# Patient Record
Sex: FEMALE | Race: WHITE
[De-identification: names, ages, dates, MRNs, and addresses within clinical notes are randomized per-mention and may not be internally consistent; named-entity substitution may affect disease eponyms.]

---

## 2017-08-03 ENCOUNTER — HOSPITAL ENCOUNTER (OUTPATIENT)
Dept: HOSPITAL 61 - MAMMO | Age: 72
Discharge: HOME | End: 2017-08-03
Attending: OBSTETRICS & GYNECOLOGY
Payer: MEDICARE

## 2017-08-03 DIAGNOSIS — Z12.31: Primary | ICD-10-CM

## 2017-08-03 NOTE — RAD
DATE: 8/3/2017



EXAM: DIGITAL SCREEN BILAT W/CAD



HISTORY: Routine screening



COMPARISON: 8/2/2016



This study was interpreted with the benefit of Computerized Aided Detection

(CAD).





The breast parenchyma is primarily fatty replaced. Breast parenchyma level

density A.





FINDINGS: No new or enlarging breast densities are seen. Benign type

calcifications are present. No suspicious microcalcifications have developed. 







IMPRESSION: Stable mammograms without evidence of malignancy.





BI-RADS CATEGORY: 2 BENIGN FINDING(S)



RECOMMENDED FOLLOW-UP: 12M 12 MONTH FOLLOW-UP



PQRS compliance statement: Patient information was entered into a reminder

system with a target due date     for the next mammogram.



Mammography is a sensitive method for finding small breast cancers, but it

does not detect them all and is not a substitute for careful clinical

examination.  A negative mammogram does not negate a clinically suspicious

finding and should not result in delay in biopsying a clinically suspicious

abnormality.



"Our facility is accredited by the American [employer] Mammography

Program."

## 2017-08-08 ENCOUNTER — HOSPITAL ENCOUNTER (OUTPATIENT)
Dept: HOSPITAL 61 - PNCL | Age: 72
End: 2017-08-08
Attending: ANESTHESIOLOGY
Payer: MEDICARE

## 2017-08-08 DIAGNOSIS — M51.16: Primary | ICD-10-CM

## 2017-08-08 DIAGNOSIS — Z83.3: ICD-10-CM

## 2017-08-08 DIAGNOSIS — Z79.82: ICD-10-CM

## 2017-08-08 DIAGNOSIS — M48.06: ICD-10-CM

## 2017-08-08 DIAGNOSIS — E11.9: ICD-10-CM

## 2017-08-08 DIAGNOSIS — I10: ICD-10-CM

## 2017-08-08 PROCEDURE — 62323 NJX INTERLAMINAR LMBR/SAC: CPT

## 2017-08-08 NOTE — PAIN
DATE OF SERVICE:  08/08/2017



PROGRESS NOTE FOR PAIN CLINIC



DIAGNOSES:  Lumbar radiculopathy with lumbar degenerative disk disease and

lumbar spinal stenosis.



HISTORY OF PRESENT ILLNESS:  The patient is a 72-year-old female who returns for

followup status post lumbar epidural steroid injection, last seen on 10/05/2016.

 The patient did very well with about 100% improvement in her low back and right

lower extremity pain until the last 2-3 months, the patient reports there has

been increasing pain into the low back and right lower extremity, mostly in the

posterior gluteus, posterior thigh region, and posterior calf, aching, shooting

off and on, worse with standing and walking, rates it at 8 on a scale of 10 at

its worst, is currently a 3 on a scale of 10, is on average a 4 on a scale of

10.  The patient reports no new motor or sensory deficits, but she has been

falling and has fallen about 3 times over the past several months because the

leg feels more fatigued and easily gives out.  The patient reports it does not

awaken her from sleep at night, feels much better with sitting or lying down,

but again most noticeable with standing and walking.  It has been bothering her

at work as well.  She is on her feet most of her working day.  The patient

reports no new motor or sensory deficits, no new bowel or bladder incontinence

or any other complaints.



PAST MEDICAL HISTORY:  Significant for diabetes and hypertension.



PAST SURGICAL HISTORY:  Tonsillectomy, breast biopsy, cervical fusion and right

L4-L5 diskectomy in 2007.



CURRENT MEDICATIONS:  Include oxycodone, simvastatin, fish oil, Prilosec,

metformin, daily baby aspirin, vitamins, biotin, glimepiride, and losartan.



ALLERGIES:  THE PATIENT HAS NO KNOWN DRUG ALLERGIES, HAS LATEX SENSITIVITY.



FAMILY HISTORY:  Significant for diabetes, cancer, heart disease, kidney

disease, and tuberculosis.



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

pain, but has to stop frequently when she is working and standing and even

walking she has to sit down, sometimes on the floor if there is no chair

available, does not use any tobacco products, does not use any alcohol.



PHYSICAL EXAMINATION:

VITAL SIGNS:  Today, the patient's blood pressure is 143/75, pulse 75,

respirations are 18, temperature is 98.6 degrees Fahrenheit, weight is 146

pounds.

GENERAL:  The patient is awake, alert, oriented, appropriate, very pleasant

demeanor.

HEENT:  Head shows normocephalic, atraumatic.  Extraocular movements are intact

and symmetrical.  Oral cavity, mucous membranes are moist and pink.  Dentition

is intact.

NECK:  Shows anterior throat supple.  Swallow reflex is symmetrical.  Neck shows

full rotational motion of the cervical spine without difficulty.

CHEST:  Shows normal on inspection.  Breath sounds are clear to auscultation

bilaterally.

HEART:  Shows S1 and S2 clear.

ABDOMEN:  Soft, nontender, nondistended.  No palpable organomegaly.  There is no

rebound or guarding demonstrated.

BACK:  Shows spine grossly in the midline.  Well-healed surgical scars noted in

the lumbar distribution.  Lumbar paraspinous muscle shows symmetrical on

inspection with palpation shows some moderate tenderness in the low lumbar

distribution diffusely bilaterally, but without radiation.  No tenderness over

the sacrum or sacroiliac regions.  The patient has good rotation and motion of

the lumbar spine, both laterally as well as extension and flexion without

difficulty.

EXTREMITIES:  Lower extremities showed deep tendon reflexes 2+ in the patellar,

1+ tendo-calcaneus tendons are equal.  Motor exam is strong with 5/5

dorsiflexion, extension, quadriceps and hamstring flexion and symmetrical

bilaterally.  Peripheral pulses are 1+ bilaterally.  No peripheral edema is

noted.



Options were discussed with the patient and the patient's old chart was reviewed

as her current medication regimen updated.  Current review of systems updated

today as well.  We will proceed with lumbar epidural steroid injections.  She

has done very well with these in the past.  Risks were again discussed

including, but not limited to bleeding, infection, possibility of epidural

hematoma, subsequent neurologic compromise, dural punctures, headaches, spinal

cord and/or nerve damage, side effects of steroid medication and poor results

regarding pain control.  The patient understands and wishes to proceed.  The

patient will return to clinic in approximately 2 weeks for followup.  She was

counseled on return appointment, activity level and side effects to be aware of.



DIAGNOSES:  Lumbar radiculopathy with lumbar degenerative disk disease and

spinal stenosis.



PROCEDURE:  Lumbar epidural steroid injection in translaminar approach at the

L5-S1 level using C-arm fluoroscopic guidance under sterile prep and drape using

local anesthetic.



MEDICATIONS INJECTED:  A total of 120 mg Depo-Medrol plus 10 mL of

preservative-free normal saline and 2 mL of Isovue for contrast.



CONDITION AT DISCHARGE:  Stable.  The patient tolerated the procedure well, had

no complications.

 



______________________________

ALEK VERA MD



DR:  GARTH/yary  JOB#:  [id number] / [id number]

DD:  08/08/2017 08:28  DT:  08/08/2017 09:26

## 2017-10-18 ENCOUNTER — HOSPITAL ENCOUNTER (OUTPATIENT)
Dept: HOSPITAL 61 - LAB | Age: 72
Discharge: HOME | End: 2017-10-18
Attending: FAMILY MEDICINE
Payer: MEDICARE

## 2017-10-18 DIAGNOSIS — E11.9: Primary | ICD-10-CM

## 2017-10-18 DIAGNOSIS — E78.5: ICD-10-CM

## 2017-10-18 DIAGNOSIS — E83.52: ICD-10-CM

## 2017-10-18 LAB
ALBUMIN SERPL-MCNC: 4.3 G/DL (ref 3.4–5)
ALBUMIN/GLOB SERPL: 1 {RATIO} (ref 1–1.7)
ALP SERPL-CCNC: 153 U/L (ref 46–116)
ALT SERPL-CCNC: 36 U/L (ref 14–59)
ANION GAP SERPL CALC-SCNC: 10 MMOL/L (ref 6–14)
AST SERPL-CCNC: 24 U/L (ref 15–37)
BILIRUB SERPL-MCNC: 0.5 MG/DL (ref 0.2–1)
BUN SERPL-MCNC: 16 MG/DL (ref 7–20)
BUN/CREAT SERPL: 16 (ref 6–20)
CALCIUM SERPL-MCNC: 9.8 MG/DL (ref 8.5–10.1)
CHLORIDE SERPL-SCNC: 103 MMOL/L (ref 98–107)
CO2 SERPL-SCNC: 31 MMOL/L (ref 21–32)
CREAT SERPL-MCNC: 1 MG/DL (ref 0.6–1)
GFR SERPLBLD BASED ON 1.73 SQ M-ARVRAT: 54.5 ML/MIN
GLOBULIN SER-MCNC: 4.3 G/DL (ref 2.2–3.8)
GLUCOSE SERPL-MCNC: 156 MG/DL (ref 70–99)
POTASSIUM SERPL-SCNC: 4.4 MMOL/L (ref 3.5–5.1)
PROT SERPL-MCNC: 8.6 G/DL (ref 6.4–8.2)
PTH-INTACT SERPL-MCNC: 25 PG/ML (ref 15–65)
SODIUM SERPL-SCNC: 144 MMOL/L (ref 136–145)

## 2017-10-18 PROCEDURE — 80053 COMPREHEN METABOLIC PANEL: CPT

## 2017-10-18 PROCEDURE — 36415 COLL VENOUS BLD VENIPUNCTURE: CPT

## 2017-10-18 PROCEDURE — 83970 ASSAY OF PARATHORMONE: CPT

## 2017-10-20 ENCOUNTER — HOSPITAL ENCOUNTER (OUTPATIENT)
Dept: HOSPITAL 61 - KCIC DEXA | Age: 72
Discharge: HOME | End: 2017-10-20
Attending: FAMILY MEDICINE
Payer: MEDICARE

## 2017-10-20 DIAGNOSIS — Z13.820: Primary | ICD-10-CM

## 2017-10-20 DIAGNOSIS — N95.9: ICD-10-CM

## 2017-10-20 DIAGNOSIS — M85.80: ICD-10-CM

## 2017-10-20 PROCEDURE — 77080 DXA BONE DENSITY AXIAL: CPT

## 2017-10-20 NOTE — KCIC
EXAM: Dual energy x-ray absorptiometry (DEXA).

 

HISTORY: Postmenopausal female presents for osteoporosis screening.

 

COMPARISON: None.

 

TECHNIQUE: Dual energy x-ray absorptiometry of the lumbar spine and left 

hip was performed.  Calculation of bone mineral density based on standard 

deviations above or below the expected young adult normal value (T-score) 

was completed. 

 

FINDINGS: The average bone mineral density in the 1st through 4th lumbar 

vertebrae is 0.992 g/cmxcm, corresponding with a T-score of -0.5.

 

The average total bone mineral density in the left hip is 0.739 g/cmxcm, 

corresponding with a  T-score of -1.7.

 

IMPRESSION:

1. Osteopenia measured at the left hip.

2. Normal bone mineral density measured the lumbar spine.

 

Note: Definitions established by the World Health Organization:

 

1. Normal: T-score is -1.0 or above.

2. Osteopenia: T-score is between -1.0 and -2.5 .

3. Osteoporosis: T-score is -2.5 or below. 

 

Electronically signed by: Aggie Patel MD (10/20/2017 1:20 PM) [place] [id number]

## 2017-11-21 ENCOUNTER — HOSPITAL ENCOUNTER (OUTPATIENT)
Dept: HOSPITAL 61 - LAB | Age: 72
Discharge: HOME | End: 2017-11-21
Attending: INTERNAL MEDICINE
Payer: MEDICARE

## 2017-11-21 DIAGNOSIS — M19.011: ICD-10-CM

## 2017-11-21 DIAGNOSIS — M19.012: Primary | ICD-10-CM

## 2017-11-21 LAB
CHOLEST SERPL-MCNC: 194 MG/DL (ref 0–200)
CHOLEST/HDLC SERPL: 3.9 {RATIO}
HDLC SERPL-MCNC: 50 MG/DL (ref 40–60)
NONHDLC SERPL-MCNC: 144 MG/DL (ref 0–129)
TRIGL SERPL-MCNC: 146 MG/DL (ref 0–150)

## 2017-11-21 PROCEDURE — 86141 C-REACTIVE PROTEIN HS: CPT

## 2017-11-21 PROCEDURE — 80061 LIPID PANEL: CPT

## 2017-11-21 PROCEDURE — 73000 X-RAY EXAM OF COLLAR BONE: CPT

## 2017-11-21 NOTE — RAD
EXAM: Bilateral clavicles 2 views.



HISTORY: Increasing visible prominence of left sternoclavicular joint.



COMPARISON: 1/11/2016.



FINDINGS: The left medial clavicle appears more hypertrophic than on the prior

study. There is no clear associated mass in this is likely from moderate

osteoarthritis. Sternoclavicular osteoarthritis appears mild on the right.



Acromioclavicular osteoarthritis is minimal on the right and mild on the left

for patient age. An inferiorly directed clavicular spur on the left measures 4

mm.



Bilateral glenohumeral osteoarthritis is mild. No fractures are identified.



IMPRESSION:

1. Hypertrophy of the left medial clavicle appears increased since the prior

study. No associated osseous lesion is seen and this is likely secondary to

moderate left sternoclavicular osteoarthritis.

2. Correlate for impingement at the left shoulder.

3. Mild bilateral glenohumeral osteoarthritis.

## 2018-01-09 ENCOUNTER — HOSPITAL ENCOUNTER (OUTPATIENT)
Dept: HOSPITAL 61 - MRI | Age: 73
Discharge: HOME | End: 2018-01-09
Attending: INTERNAL MEDICINE
Payer: MEDICARE

## 2018-01-09 DIAGNOSIS — M48.02: ICD-10-CM

## 2018-01-09 DIAGNOSIS — Z98.890: ICD-10-CM

## 2018-01-09 DIAGNOSIS — R29.6: ICD-10-CM

## 2018-01-09 DIAGNOSIS — M51.26: ICD-10-CM

## 2018-01-09 DIAGNOSIS — M25.78: Primary | ICD-10-CM

## 2018-01-09 PROCEDURE — 72148 MRI LUMBAR SPINE W/O DYE: CPT

## 2018-01-09 PROCEDURE — 72146 MRI CHEST SPINE W/O DYE: CPT

## 2018-01-09 PROCEDURE — 72141 MRI NECK SPINE W/O DYE: CPT

## 2018-03-12 ENCOUNTER — HOSPITAL ENCOUNTER (OUTPATIENT)
Dept: HOSPITAL 61 - PNCL | Age: 73
End: 2018-03-12
Attending: ANESTHESIOLOGY
Payer: MEDICARE

## 2018-03-12 DIAGNOSIS — M51.16: Primary | ICD-10-CM

## 2018-03-12 DIAGNOSIS — M48.062: ICD-10-CM

## 2018-03-12 PROCEDURE — 62323 NJX INTERLAMINAR LMBR/SAC: CPT

## 2018-04-02 ENCOUNTER — HOSPITAL ENCOUNTER (OUTPATIENT)
Dept: HOSPITAL 61 - LAB | Age: 73
Discharge: HOME | End: 2018-04-02
Payer: MEDICARE

## 2018-04-02 DIAGNOSIS — R26.89: ICD-10-CM

## 2018-04-02 DIAGNOSIS — R74.8: Primary | ICD-10-CM

## 2018-04-02 DIAGNOSIS — R41.3: ICD-10-CM

## 2018-04-02 LAB
CK SERPL-CCNC: 122 U/L (ref 26–192)
CRP SERPL-MCNC: 1.6 MG/L (ref 0–3.3)
FOLATE: 10.27 NG/ML (ref 3.2–20)
MAGNESIUM: 1.9 MG/DL (ref 1.8–2.4)
SEDIMENTATION RATE: 8 (ref 0–25)
THYROID STIM HORMONE (TSH): 1.79 UIU/ML (ref 0.36–3.74)
VITAMIN-B12: 260 PG/ML (ref 247–911)

## 2018-04-02 PROCEDURE — 85651 RBC SED RATE NONAUTOMATED: CPT

## 2018-04-02 PROCEDURE — 82607 VITAMIN B-12: CPT

## 2018-04-02 PROCEDURE — 83735 ASSAY OF MAGNESIUM: CPT

## 2018-04-02 PROCEDURE — 82746 ASSAY OF FOLIC ACID SERUM: CPT

## 2018-04-02 PROCEDURE — 84443 ASSAY THYROID STIM HORMONE: CPT

## 2018-04-02 PROCEDURE — 86140 C-REACTIVE PROTEIN: CPT

## 2018-04-02 PROCEDURE — 84165 PROTEIN E-PHORESIS SERUM: CPT

## 2018-04-02 PROCEDURE — 82330 ASSAY OF CALCIUM: CPT

## 2018-04-02 PROCEDURE — 82550 ASSAY OF CK (CPK): CPT

## 2018-04-02 PROCEDURE — 36415 COLL VENOUS BLD VENIPUNCTURE: CPT

## 2018-04-02 PROCEDURE — 84075 ASSAY ALKALINE PHOSPHATASE: CPT

## 2018-04-02 PROCEDURE — 86334 IMMUNOFIX E-PHORESIS SERUM: CPT

## 2018-04-05 LAB
ALBUM: 4.3 G/DL (ref 2.9–4.4)
ALPHA 1: 0.3 G/DL (ref 0–0.4)
ALPHA 2: 0.9 G/DL (ref 0.4–1)
BETA: 1.3 G/DL (ref 0.7–1.3)
GAMMA: 0.8 G/DL (ref 0.4–1.8)
IMMUNOGLOBULIN A: 278 MG/DL (ref 64–422)
IMMUNOGLOBULIN G: 727 MG/DL (ref 700–1600)
IMMUNOGLOBULIN M: 86 MG/DL (ref 26–217)
PROTEIN TOTAL: 7.5 G/DL (ref 6–8.5)
SPEP AG RATIO: 1.3 (ref 0.7–1.7)

## 2018-04-16 ENCOUNTER — HOSPITAL ENCOUNTER (OUTPATIENT)
Dept: HOSPITAL 61 - MRI | Age: 73
Discharge: HOME | End: 2018-04-16
Payer: MEDICARE

## 2018-04-16 DIAGNOSIS — E11.9: ICD-10-CM

## 2018-04-16 DIAGNOSIS — M48.02: Primary | ICD-10-CM

## 2018-04-16 DIAGNOSIS — E78.5: ICD-10-CM

## 2018-04-16 LAB
CREAT SERPL-MCNC: 0.9 MG/DL (ref 0.6–1)
GFR SERPLBLD BASED ON 1.73 SQ M-ARVRAT: 61.5 ML/MIN

## 2018-04-16 PROCEDURE — 82565 ASSAY OF CREATININE: CPT

## 2018-04-16 PROCEDURE — 36415 COLL VENOUS BLD VENIPUNCTURE: CPT

## 2018-04-16 PROCEDURE — 70553 MRI BRAIN STEM W/O & W/DYE: CPT

## 2018-04-16 RX ADMIN — GADOBUTROL 1 MMOL: 604.72 INJECTION INTRAVENOUS at 15:33

## 2018-08-07 ENCOUNTER — HOSPITAL ENCOUNTER (OUTPATIENT)
Dept: HOSPITAL 61 - LAB | Age: 73
Discharge: HOME | End: 2018-08-07
Attending: INTERNAL MEDICINE
Payer: MEDICARE

## 2018-08-07 ENCOUNTER — HOSPITAL ENCOUNTER (OUTPATIENT)
Dept: HOSPITAL 61 - MAMMO | Age: 73
Discharge: HOME | End: 2018-08-07
Attending: FAMILY MEDICINE
Payer: MEDICARE

## 2018-08-07 DIAGNOSIS — E78.5: Primary | ICD-10-CM

## 2018-08-07 DIAGNOSIS — E11.9: ICD-10-CM

## 2018-08-07 DIAGNOSIS — E78.5: ICD-10-CM

## 2018-08-07 DIAGNOSIS — I10: ICD-10-CM

## 2018-08-07 DIAGNOSIS — Z12.31: Primary | ICD-10-CM

## 2018-08-07 LAB
CHOLESTEROL/HDL RATIO: 1.9
CHOLESTEROL: 88 MG/DL (ref 0–200)
HDLC: 47 MG/DL (ref 40–60)
LDLC: 22 MG/DL (ref 0–100)
NON-HDL CHOLESTEROL: 41 MG/DL (ref 0–129)
TRIGLYCERIDES: 93 MG/DL (ref 0–150)
VLDLC: 19 MG/DL (ref 0–40)

## 2018-08-07 PROCEDURE — 77063 BREAST TOMOSYNTHESIS BI: CPT

## 2018-08-07 PROCEDURE — 83695 ASSAY OF LIPOPROTEIN(A): CPT

## 2018-08-07 PROCEDURE — 36415 COLL VENOUS BLD VENIPUNCTURE: CPT

## 2018-08-07 PROCEDURE — 77067 SCR MAMMO BI INCL CAD: CPT

## 2018-08-07 PROCEDURE — 80061 LIPID PANEL: CPT

## 2018-08-09 LAB — LIPOPROTEIN A: 4 NMOL/L (ref ?–75)

## 2018-10-17 ENCOUNTER — HOSPITAL ENCOUNTER (OUTPATIENT)
Dept: HOSPITAL 61 - PNCL | Age: 73
Discharge: HOME | End: 2018-10-17
Attending: ANESTHESIOLOGY
Payer: MEDICARE

## 2018-10-17 DIAGNOSIS — M48.062: ICD-10-CM

## 2018-10-17 DIAGNOSIS — M51.16: Primary | ICD-10-CM

## 2018-10-17 PROCEDURE — 62323 NJX INTERLAMINAR LMBR/SAC: CPT

## 2018-10-17 NOTE — PAIN
DATE OF SERVICE:  10/17/2018



DIAGNOSES:  Lumbar radiculopathy with lumbar degenerative disk disease, lumbar

spinal stenosis with neurogenic claudication.



HISTORY OF PRESENT ILLNESS:  The patient is a 73-year-old female who returns for

followup status post lumbar epidural steroid injection, most recently

03/12/2018.  The patient did very well with this with near 90% improvement until

the last few weeks, pain has been returning in the low back and bilaterally in

the lower extremity slightly worse on the left than the right, as it was worse

on the right last time, now is worse on the left in the lateral anterior thigh,

posterior thigh, posterior calf, posterior ankle on the left side with walking,

standing, changing positions, worse with standing for prolonged periods or

riding in a car.  The patient had a long trip, which she took a few months ago,

which caused some significant pain across the low back and into the left leg. 

The patient reports last 3 days as some constant pain.  The patient reports it

is radiating, becoming more severe, more constant.  It is described as a 9 on a

scale of 10 at its worst, 5 on average, 2 at its least and is a 5 today.  The

patient reports no new motor or sensory deficits, no new bowel or bladder

incontinence.  Initially, she was able to walk with greater ease and comfort,

was working, doing more functions much better and with more ease, sleeping well

at night, still does not awaken her from sleep, better with lying down.



PHYSICAL EXAMINATION:

VITAL SIGNS:  The patient's blood pressure 107/60, pulse 79, respirations 16,

temperature 98.4 degrees Fahrenheit, height is 5 feet 6 inches, weight is 140

pounds.

GENERAL:  The patient is awake, alert, oriented, appropriate, very pleasant

demeanor.

HEENT:  Head shows normocephalic, atraumatic.  Extraocular muscles are intact

and symmetrical.  Oral cavity:  Mucous membranes moist and pink.  Dentition is

intact.

NECK:  Shows anterior throat supple without palpable lymphadenopathy noted. 

Swallow reflex is symmetrical.

CHEST:  Shows normal on inspection.  Breath sounds clear to auscultation

bilaterally.

HEART:  Shows S1, S2 clear.  No murmurs auscultated.

ABDOMEN:  Soft, nontender, nondistended.  No palpable organomegaly is noted.  No

rebound or guarding demonstrated.

BACK:  Shows spine grossly in the midline.  Normal appearing thoracic kyphosis

and lumbar lordotic curvature.  Lumbar paraspinous muscle shows symmetrical on

inspection.  On palpation shows some mild tenderness throughout the upper,

middle, lower distribution of paraspinous muscles, but only diffusely without

radiation.  The patient has good rotational motion of lumbar spine, both

laterally as well as extension and flexion without difficulty.

EXTREMITIES:  Lower extremities show deep tendon reflexes at 2+ in the patellar,

1+ tendo-calcaneus tendons.  Motor exam is strong with 5/5 dorsiflexion,

extension, quadriceps and hamstring flexion.  Peripheral pulses are 1+ posterior

tibia.  No peripheral edema is noted bilaterally.



Options were discussed with the patient.  The patient's old chart was reviewed

as her current medication regimen updated.  Current review of systems updated

today as well.  We will proceed with a lumbar epidural steroid injection first

in this series.  Risks were again discussed including, but not limited to

bleeding, infection, possibility of epidural hematoma, subsequent neurological

compromise, dural puncture, headaches, spinal cord and/or nerve damage, side

effects of steroid medication and poor results regarding pain control.  The

patient understands and wished to proceed.  The patient to return to clinic in

approximately 2 weeks for followup, was counseled as to return appointment,

activity level and side effects to be aware of.



DIAGNOSES:  Lumbar radiculopathy with lumbar degenerative disk disease, lumbar

spinal stenosis with neurogenic claudication.



PROCEDURE:  Lumbar epidural steroid injection, translaminar approach at the

L5-S1 level using C-arm fluoroscopic guidance under sterile prep and drape using

local anesthetic.



MEDICATION INJECTED:  A total of 120 mg Depo-Medrol plus 10 mL of

preservative-free normal saline and 2 mL of Isovue for contrast.



CONDITION AT DISCHARGE:  Stable.  The patient tolerated the procedure well, had

no complications.

 



______________________________

ALEK VERA MD



DR:  GARTH/yary  JOB#:  [id number] / [id number]

DD:  10/17/2018 08:13  DT:  10/17/2018 19:04

## 2018-10-23 ENCOUNTER — HOSPITAL ENCOUNTER (OUTPATIENT)
Dept: HOSPITAL 61 - LAB | Age: 73
Discharge: HOME | End: 2018-10-23
Attending: INTERNAL MEDICINE
Payer: MEDICARE

## 2018-10-23 DIAGNOSIS — I10: Primary | ICD-10-CM

## 2018-10-23 DIAGNOSIS — E78.2: ICD-10-CM

## 2018-10-23 LAB
T3RU NFR SERPL: 31 % (ref 24–39)
T4 SERPL-MCNC: 11.2 UG/DL (ref 4.5–12)

## 2018-10-23 PROCEDURE — 84436 ASSAY OF TOTAL THYROXINE: CPT

## 2018-10-23 PROCEDURE — 83695 ASSAY OF LIPOPROTEIN(A): CPT

## 2018-10-23 PROCEDURE — 84479 ASSAY OF THYROID (T3 OR T4): CPT

## 2018-10-23 PROCEDURE — 82465 ASSAY BLD/SERUM CHOLESTEROL: CPT

## 2018-10-23 PROCEDURE — 84443 ASSAY THYROID STIM HORMONE: CPT

## 2018-10-23 PROCEDURE — 36415 COLL VENOUS BLD VENIPUNCTURE: CPT

## 2018-12-17 ENCOUNTER — HOSPITAL ENCOUNTER (OUTPATIENT)
Dept: HOSPITAL 61 - LAB | Age: 73
Discharge: HOME | End: 2018-12-17
Attending: INTERNAL MEDICINE
Payer: MEDICARE

## 2018-12-17 DIAGNOSIS — E78.2: Primary | ICD-10-CM

## 2018-12-17 LAB
CHOLEST SERPL-MCNC: 96 MG/DL (ref 0–200)
CHOLEST/HDLC SERPL: 1.8 {RATIO}
HDLC SERPL-MCNC: 54 MG/DL (ref 40–60)
LDLC: 20 MG/DL (ref 0–100)
TRIGL SERPL-MCNC: 111 MG/DL (ref 0–150)
VLDLC: 22 MG/DL (ref 0–40)

## 2018-12-17 PROCEDURE — 80061 LIPID PANEL: CPT

## 2018-12-17 PROCEDURE — 36415 COLL VENOUS BLD VENIPUNCTURE: CPT

## 2019-03-08 ENCOUNTER — HOSPITAL ENCOUNTER (OUTPATIENT)
Dept: HOSPITAL 61 - PNCL | Age: 74
Discharge: HOME | End: 2019-03-08
Attending: ANESTHESIOLOGY
Payer: MEDICARE

## 2019-03-08 DIAGNOSIS — M48.062: ICD-10-CM

## 2019-03-08 DIAGNOSIS — M51.16: Primary | ICD-10-CM

## 2019-03-08 PROCEDURE — 62323 NJX INTERLAMINAR LMBR/SAC: CPT

## 2019-03-08 NOTE — PAIN
DATE OF SERVICE:  03/08/2019



DIAGNOSES:  Lumbar radiculopathy with lumbar degenerative disk disease, lumbar

spinal stenosis with neurogenic claudication.



HISTORY OF PRESENT ILLNESS:  The patient is a 73-year-old female who returns for

followup status post lumbar epidural steroid injection x 1, last seen

10/17/2018.  The patient did very well with this with 95% improvement until

about 2 weeks ago, the patient reports the pain began to return to the low back,

right lower extremity, posterior gluteus, posterior thigh, posterior calf,

radiating into the lateral calf as well on the right side.  The patient reports

it is worse with walking, standing, change in positions especially she was

increasing her distance walking, increasing work activities, home activities,

sleeping better at night, still sleeps well, does not awaken her from sleep. 

The pain is becoming more noticeable with walking and standing and with

prolonged sitting.  The patient reports it is a 9 on a scale of 10 at its worst,

4 on average, 1 at its least and is a 4 today.  The patient reports it is aching

in the low back, right hip, down the right leg, radiating with some cramping and

shooting pain as well, on and off in intensity, worse with standing and walking

for more than 10-15 minutes.  The patient reports no new motor or sensory

deficits, no new bowel or bladder incontinence or other complaints.



PHYSICAL EXAMINATION:

VITAL SIGNS:  The patient's blood pressure is 132/80, pulse 72, respirations are

18, temperature is 98.8 degrees Fahrenheit, height is 5 feet 6 inches, weight

134 pounds.

GENERAL:  The patient is awake, alert, oriented, appropriate, very pleasant

demeanor.

HEENT:  Head shows normocephalic, atraumatic.  Extraocular movements are intact

and symmetrical.  Oral cavity:  Mucous membranes moist and pink.  Dentition is

intact.

NECK:  Shows anterior throat supple without palpable lymphadenopathy noted. 

Swallow reflex is symmetrical.

CHEST:  Shows normal on inspection.  Breath sounds clear to auscultation

bilaterally.

HEART:  Shows S1, S2 clear.  No murmurs auscultated.

ABDOMEN:  Soft, nontender, nondistended.  No palpable organomegaly is noted.  No

rebound or guarding demonstrated.

BACK:  Shows spine grossly in the midline.  Normal appearing thoracic kyphosis

and some minor flattening of lumbar lordotic curvature.  Lumbar paraspinous

musculature symmetrical on inspection with palpation shows some moderate

tenderness diffusely in the low paraspinous musculature in the distribution of

the lumbar musculature, but without radiation, without trigger points.  The

patient has good rotational motion of lumbar spine, both laterally as well as

extension and flexion without difficulty.  No tenderness over the sacrum or

sacroiliac regions.

EXTREMITIES:  The patient's lower extremities show deep tendon reflexes at 2+ in

the patellar, 1+ tendo calcaneus tendons.  Motor exam is strong with 5/5

dorsiflexion, extension, quadriceps and hamstring flexion equal.  Peripheral

pulses are 1+ posterior tibial.  No peripheral edema is noted bilaterally.



Options were discussed with the patient.  The patient's old chart was reviewed

as her current medication regimen and updated.  Current review of systems is

updated today as well and we will proceed with a second in the series of lumbar

epidural steroid injection today with fluoroscopic guidance.  Risks were again

discussed including, but not limited to bleeding, infection, possibility of

epidural hematoma, subsequent neurological compromise, dural puncture headaches,

spinal cord and/or nerve damage, side effects of steroid medication and poor

results regarding pain control.  The patient understands and wished to proceed. 

The patient will return to clinic in approximately 2 weeks for followup, was

counseled as to return appointment, activity level and side effects to be aware

of.



DIAGNOSIS:  Lumbar radiculopathy with lumbar degenerative disk disease, lumbar

spinal stenosis with neurogenic claudication.



PROCEDURE:  Lumbar epidural steroid injection, translaminar approach L5-S1 level

using C-arm fluoroscopic guidance under sterile prep and drape using local

anesthetic.



MEDICATION INJECTED:  A total of 120 mg Depo-Medrol plus 10 mL of

preservative-free normal saline and 2 mL of Isovue for contrast.



CONDITION AT DISCHARGE:  Stable.  The patient tolerated procedure well, had no

complications.

 



______________________________

ALEK VERA MD



DR:  GARTH/yary  JOB#:  [id number] / [id number]

DD:  03/08/2019 08:48  DT:  03/08/2019 17:43

## 2019-08-26 ENCOUNTER — HOSPITAL ENCOUNTER (OUTPATIENT)
Dept: HOSPITAL 61 - LAB | Age: 74
Discharge: HOME | End: 2019-08-26
Attending: INTERNAL MEDICINE
Payer: MEDICARE

## 2019-08-26 DIAGNOSIS — E11.9: Primary | ICD-10-CM

## 2019-08-26 LAB
ALBUMIN SERPL-MCNC: 4.1 G/DL (ref 3.4–5)
ALBUMIN/GLOB SERPL: 1.2 {RATIO} (ref 1–1.7)
ALP SERPL-CCNC: 124 U/L (ref 46–116)
ALT SERPL-CCNC: 31 U/L (ref 14–59)
ANION GAP SERPL CALC-SCNC: 10 MMOL/L (ref 6–14)
AST SERPL-CCNC: 21 U/L (ref 15–37)
BILIRUB SERPL-MCNC: 0.6 MG/DL (ref 0.2–1)
BUN SERPL-MCNC: 12 MG/DL (ref 7–20)
BUN/CREAT SERPL: 15 (ref 6–20)
CALCIUM SERPL-MCNC: 9.7 MG/DL (ref 8.5–10.1)
CHLORIDE SERPL-SCNC: 104 MMOL/L (ref 98–107)
CHOLEST SERPL-MCNC: 89 MG/DL (ref 0–200)
CHOLEST/HDLC SERPL: 1.9 {RATIO}
CO2 SERPL-SCNC: 29 MMOL/L (ref 21–32)
CREAT SERPL-MCNC: 0.8 MG/DL (ref 0.6–1)
GFR SERPLBLD BASED ON 1.73 SQ M-ARVRAT: 70.1 ML/MIN
GLOBULIN SER-MCNC: 3.3 G/DL (ref 2.2–3.8)
GLUCOSE SERPL-MCNC: 127 MG/DL (ref 70–99)
HBV SURFACE AB SER RIA-ACNC: 59.4 MG/DL
HBV SURFACE AG SERPL QL IA: 9.3 UG/ML
HDLC SERPL-MCNC: 47 MG/DL (ref 40–60)
LDLC: 18 MG/DL (ref 0–100)
MICRO CREAT RATIO: 15.7 MG/G CREAT (ref 0–30)
POTASSIUM SERPL-SCNC: 4.6 MMOL/L (ref 3.5–5.1)
PROT SERPL-MCNC: 7.4 G/DL (ref 6.4–8.2)
SODIUM SERPL-SCNC: 143 MMOL/L (ref 136–145)
TRIGL SERPL-MCNC: 118 MG/DL (ref 0–150)
VLDLC: 24 MG/DL (ref 0–40)

## 2019-08-26 PROCEDURE — 82570 ASSAY OF URINE CREATININE: CPT

## 2019-08-26 PROCEDURE — 84443 ASSAY THYROID STIM HORMONE: CPT

## 2019-08-26 PROCEDURE — 80061 LIPID PANEL: CPT

## 2019-08-26 PROCEDURE — 80053 COMPREHEN METABOLIC PANEL: CPT

## 2019-08-26 PROCEDURE — 36415 COLL VENOUS BLD VENIPUNCTURE: CPT

## 2019-08-26 PROCEDURE — 82043 UR ALBUMIN QUANTITATIVE: CPT

## 2019-08-26 PROCEDURE — 83036 HEMOGLOBIN GLYCOSYLATED A1C: CPT

## 2019-08-27 LAB — HBA1C MFR BLD: 6.7 % (ref 4.8–5.6)

## 2019-09-20 ENCOUNTER — HOSPITAL ENCOUNTER (OUTPATIENT)
Dept: HOSPITAL 61 - RAD | Age: 74
Discharge: HOME | End: 2019-09-20
Attending: INTERNAL MEDICINE
Payer: MEDICARE

## 2019-09-20 DIAGNOSIS — Z79.899: ICD-10-CM

## 2019-09-20 DIAGNOSIS — E11.9: Primary | ICD-10-CM

## 2019-09-20 PROCEDURE — 82306 VITAMIN D 25 HYDROXY: CPT

## 2019-09-20 PROCEDURE — 36415 COLL VENOUS BLD VENIPUNCTURE: CPT

## 2019-09-20 PROCEDURE — 83700 LIPOPRO BLD ELECTROPHORETIC: CPT

## 2019-09-23 ENCOUNTER — HOSPITAL ENCOUNTER (OUTPATIENT)
Dept: HOSPITAL 61 - KCIC MAMMO | Age: 74
Discharge: HOME | End: 2019-09-23
Attending: INTERNAL MEDICINE
Payer: MEDICARE

## 2019-09-23 DIAGNOSIS — N64.89: ICD-10-CM

## 2019-09-23 DIAGNOSIS — Z12.31: Primary | ICD-10-CM

## 2019-09-23 PROCEDURE — 77063 BREAST TOMOSYNTHESIS BI: CPT

## 2019-09-23 PROCEDURE — 77067 SCR MAMMO BI INCL CAD: CPT

## 2019-09-23 NOTE — KCIC
Bilateral digital screening mammograms with 3-D tomosynthesis:

 

Reason for examination: Routine screening.

 

Comparison is made to previous studies dated 8/7/2018 and 8/3/2017.

 

Bilateral mammograms in CC and oblique projections were obtained with 2-D 

imaging and 3-D tomosynthesis imaging on a Siemens Inspiration unit and 

reviewed on the workstation. Interpretation was made with the benefit of 

CAD.

 

The skin and nipples show no abnormalities. No abnormal axillary lymph 

nodes are seen. The breast parenchyma is predominantly fatty. (Breast 

density: Category A.) There are no dominant masses, suspicious 

calcifications or architectural distortion. Benign calcifications are 

present.

 

Impression:

 

No evidence of malignancy. Recommend routine screening.

 

BI-RAD Category 2: Benign.

 

"Our facility is accredited by the American [employer] 

Mammography Program."

 

This patient's information has been entered into a reminder system for the

patient to be notified with the results of her examination and a target 

date for the next mammogram.

 

Electronically signed by: Niyah Vasquez MD (9/23/2019 2:47 PM) [id number]

## 2019-10-21 ENCOUNTER — HOSPITAL ENCOUNTER (OUTPATIENT)
Dept: HOSPITAL 61 - KCIC DEXA | Age: 74
Discharge: HOME | End: 2019-10-21
Attending: INTERNAL MEDICINE
Payer: MEDICARE

## 2019-10-21 DIAGNOSIS — N95.9: ICD-10-CM

## 2019-10-21 DIAGNOSIS — E11.9: ICD-10-CM

## 2019-10-21 DIAGNOSIS — M85.88: Primary | ICD-10-CM

## 2019-10-21 PROCEDURE — 77080 DXA BONE DENSITY AXIAL: CPT

## 2019-10-21 NOTE — KCIC
EXAM: Dual energy x-ray absorptiometry (DEXA).

 

HISTORY: Postmenopausal female presents for osteoporosis screening.

 

COMPARISON: 10/20/2017.

 

TECHNIQUE: Dual energy x-ray absorptiometry of the lumbar spine and left 

hip was performed.  Calculation of bone mineral density based on standard 

deviations above or below the expected young adult normal value (T-score) 

was completed. 

 

FINDINGS: The average bone mineral density in the 1st through 4th lumbar 

vertebrae is 0.983 g/cmxcm, corresponding with a T-score of -0.6. There 

has been a 6.5% increase in density of the lumbar spine compared to the 

prior study.

 

The average total bone mineral density in the left hip is 0.720 g/cmxcm, 

corresponding with a T-score of -1.8. There has been a 3.0% decrease in 

density of the left hip compared to the prior study.

 

IMPRESSION:   

 

1. Osteopenia measured at the left hip.

2. Normal bone mineral density measured at the lumbar spine.

 

Note: Definitions established by the World Health Organization:

 

1. Normal: T-score is -1.0 or above.

2. Osteopenia: T-score is between -1.0 and -2.5 .

3. Osteoporosis: T-score is -2.5 or below. 

 

Electronically signed by: Aggie Patel MD (10/21/2019 12:59 PM) 

[place] [id number]

## 2020-02-21 ENCOUNTER — HOSPITAL ENCOUNTER (OUTPATIENT)
Dept: HOSPITAL 61 - LAB | Age: 75
Discharge: HOME | End: 2020-02-21
Attending: INTERNAL MEDICINE
Payer: MEDICARE

## 2020-02-21 DIAGNOSIS — E11.8: Primary | ICD-10-CM

## 2020-02-21 LAB
APTT PPP: YELLOW S
BACTERIA #/AREA URNS HPF: (no result) /HPF
BILIRUB UR QL STRIP: NEGATIVE
FIBRINOGEN PPP-MCNC: (no result) MG/DL
HBV SURFACE AB SER RIA-ACNC: 68.9 MG/DL
HBV SURFACE AG SERPL QL IA: 418 UG/ML
HYALINE CASTS #/AREA URNS LPF: (no result) /HPF
MICRO CREAT RATIO: 607 MG/G CREAT (ref 0–29)
NITRITE UR QL STRIP: NEGATIVE
PH UR STRIP: 5 [PH]
PROT UR STRIP-MCNC: 100 MG/DL
RBC #/AREA URNS HPF: (no result) /HPF (ref 0–2)
SQUAMOUS #/AREA URNS LPF: (no result) /LPF
UROBILINOGEN UR-MCNC: 0.2 MG/DL
WBC #/AREA URNS HPF: >40 /HPF (ref 0–4)

## 2020-02-21 PROCEDURE — 82570 ASSAY OF URINE CREATININE: CPT

## 2020-02-21 PROCEDURE — 87086 URINE CULTURE/COLONY COUNT: CPT

## 2020-02-21 PROCEDURE — 81001 URINALYSIS AUTO W/SCOPE: CPT

## 2020-02-21 PROCEDURE — 82043 UR ALBUMIN QUANTITATIVE: CPT

## 2020-02-21 PROCEDURE — 36415 COLL VENOUS BLD VENIPUNCTURE: CPT

## 2020-08-11 ENCOUNTER — HOSPITAL ENCOUNTER (OUTPATIENT)
Dept: HOSPITAL 61 - PNCL | Age: 75
Discharge: HOME | End: 2020-08-11
Attending: ANESTHESIOLOGY
Payer: MEDICARE

## 2020-08-11 DIAGNOSIS — M51.16: Primary | ICD-10-CM

## 2020-08-11 DIAGNOSIS — Z79.82: ICD-10-CM

## 2020-08-11 DIAGNOSIS — Z79.899: ICD-10-CM

## 2020-08-11 DIAGNOSIS — E11.9: ICD-10-CM

## 2020-08-11 DIAGNOSIS — M48.062: ICD-10-CM

## 2020-08-11 PROCEDURE — 62323 NJX INTERLAMINAR LMBR/SAC: CPT

## 2020-08-11 NOTE — PDOC
Progress Note - Pain Clinic


Date of Service:


DOS:


DATE: 8/11/20 


TIME: 08:12





Diagnosis:


Dx:


Lumbar radiculopathy with lumbar degenerative disc disease and lumbar spinal 

stenosis with neurogenic claudication





History or Present Illness:


HPI:


75-year-old female returns follow-up status post lumbar epidural steroid 

injections last seen  March 8, 2019.  Patient reports about 90% improvement for 

about 6 months after the last visit patient ports pain is returning now in the 

low back and the right lower extremities was previously low back right posterior

gluteus posterior thigh posterior calf lateral calf as well as lateral thigh 

patient reports a 9 on scale of 10 is worst over the past week 5 on average 2-3 

sensitive to today patient which is aching and sharp shooting into the right 

lower extremity radiating across the low back as well.  Patient ports no loss of

motor function but significant fatigability over the past several months.  

Patient which is walking much better during work activities household activities

try with greater ease and comfort previously.  Reports the pain is returning now

as noted is beginning to wake her from sleep occasionally but generally worse 

with walking standing.  Patient ports no new motor or sensory deficits no new 

bowel or bladder con's or other complaints.





Physical Exam:


VS:


Blood pressure 177/87 pulse 74 respirations are 16 temperature is 98.6 F weight

is 1 4 0 pounds.


PE:


PHYSICAL EXAMINATION:





GENERAL: The patient is awake, alert, oriented, appropriate, very pleasant 

demeanor


HEENT: Shows normocephalic, atraumatic.  Extraocular movements are intact and 

symmetrical.  Oral cavity: Mucous membranes moist and pink.  Dentition is 

intact.


NECK: Shows anterior throat supple without palpable lymphadenopathy noted.  

Swallow reflex symmetrical.


CHEST: Shows normal on inspection.  Breath sounds are clear bilaterally no rales

rhonchi or wheezes auscultated.


HEART: Shows S1, S2 clear.  No murmurs auscultated.


ABDOMEN: Soft, nontender, nondistended.  No palpable organomegaly is noted.  No 

rebound or guarding demonstrated.


BACK: Shows spine grossly in the midline.  Normal-appearing cervical lordotic 

curvature.  There is slightly increased thoracic kyphosis, some minor flattening

of the lumbar lordotic curvature.  Lumbar paraspinous muscles show symmetrical 

on inspection, on palpation shows some moderate tenderness diffusely throughout 

the upper, middle and lower distribution of the paraspinous muscles bilaterally 

and also into the lower thoracic paraspinous musculature, firm and tender, but 

without specific trigger points, without radiation of pain.  The patient has 

good rotational motion of the lumbar spine, both laterally as well as extension 

and flexion without significant difficulty.  No tenderness over the spinous 

processes, sacrum or sacroiliac regions.


EXTREMITIES: Lower extremities show deep tendon reflexes 2+ in the patellar and 

tendo calcaneus tendons.  Motor exam is  [] on a scale of 5 with right 

dorsiflexion, extension, quadriceps and hamstring flexion and 5/5 on the left.  

Peripheral pulses are 1+ posterior tibial.  No peripheral edema is noted 

bilaterally.  Lower extremities are warm and dry to touch, equal in color and 

appearance.  Straight leg raise noted to be negative on the right , left side is

 negative.  Gaenslen's and Julius's maneuvers are negative as well.  The 

patient is able to stand stand on her toes as given difficulty or loss of 

balance and walks with a slight favoring gait favoring the right lower extremity

 without any assistive devices or canes or walker to ambulate..


SKIN: Shows warm and dry, good turgor.  No edema.  No sores, rashes or bruising 

throughout.





Procedure:


Procedure:


Options discussed with the patient.  Patient chart was reviewed as her current 

medication regimen updated current review of systems updated today as well.  We 

will proceed with a lumbar epidural steroid injections today with the first in 

the series.  Risks again discussed including but not limited to bleeding 

infection possibility of epidural hematoma and subsequent neurological 

compromise dural puncture headache spinal cord and or nerve damage side effects 

of steroid medication and/guarding pain control.  Patient understands wished to 

proceed.  Patient return to clinic in approximately 2 weeks for follow-up was 

counseled to return appointment activity level and side effects to be aware of.





Medication Injected:


Med Injected:


Procedure is lumbar epidural steroid injection under local anesthetic using 

sterile prep and drape at the L5-S1 level using C-arm fluoroscopic guidance in 

both AP and lateral views medications injected is 120 mg Depo-Medrol + 10 mL 

preservative-free normal saline and 2 mL Isovue for contrast- condition at 

discharge is stable patient tolerated procedure well had no complications.





Condition at Discharge:


Condition at Discharge:


Condition at discharge stable patient tolerated procedure well had no immediate 

complications.











ALEK VERA MD               Aug 11, 2020 08:16

## 2020-09-24 ENCOUNTER — HOSPITAL ENCOUNTER (OUTPATIENT)
Dept: HOSPITAL 61 - MAMMO | Age: 75
Discharge: HOME | End: 2020-09-24
Attending: INTERNAL MEDICINE
Payer: MEDICARE

## 2020-09-24 DIAGNOSIS — Z12.31: Primary | ICD-10-CM

## 2020-09-24 PROCEDURE — 77067 SCR MAMMO BI INCL CAD: CPT

## 2020-09-24 PROCEDURE — 77063 BREAST TOMOSYNTHESIS BI: CPT

## 2020-09-24 NOTE — RAD
BILATERAL SCREENING MAMMOGRAM, 3-D

 

History: Routine screening.

 

Comparison:  9/23/2019, 8/7/2018, 8/3/2017, 08/02/2016. 

 

Technique:  MLO and CC digital tomosynthesis (3D) images obtained. 

Radiologist reviewed these images on dedicated workstation.

 

Findings: 

Breast Tissue Density B : There are scattered areas of fibroglandular 

density.

 

There are no dominant masses, suspicious microcalcifications, or 

architectural distortion.

 

IMPRESSION:

No mammographic evidence of malignancy. Recommend routine screening.

 

BI-RADS category 1:  Negative.

 

The images were reviewed with computer-aided detection.

 

Patient information is entered into reminder system with a target due date

for the next screening mammogram.

 

Mammography is the most sensitive method for finding small breast cancers,

but it does not detect them all and is not a substitute for careful 

clinical examination. A negative mammogram does not negate a clinically 

suspicious finding and should not result in delay in biopsying a 

clinically suspicious abnormality.

 

 "Our facility is accredited by the [employer] 

Mammography Program."

 

Electronically signed by: Sanjeev Wisdom MD (9/24/2020 11:38 AM) [email or web address]

## 2021-09-23 ENCOUNTER — HOSPITAL ENCOUNTER (OUTPATIENT)
Dept: HOSPITAL 61 - MAMMO | Age: 76
End: 2021-09-23
Attending: INTERNAL MEDICINE
Payer: MEDICARE

## 2021-09-23 DIAGNOSIS — Z12.31: Primary | ICD-10-CM

## 2021-09-23 PROCEDURE — 77063 BREAST TOMOSYNTHESIS BI: CPT

## 2021-09-23 PROCEDURE — 77067 SCR MAMMO BI INCL CAD: CPT

## 2021-09-23 NOTE — RAD
MG BILAT SCREEN+YAMILETH 9/23/2021 7:55 AM



INDICATION: Asymptomatic screening mammogram.



COMPARISON: 9/24/2020, 8/7/2018



TECHNIQUE: 3D tomosynthesis was performed in CC and MLO projections. 2D views were obtained from the 
3D data. CAD was utilized as needed.



FINDINGS:



Breast density: Category A: The breasts are almost entirely fatty.



Right breast: There are no suspicious microcalcifications, masses or areas of architectural distortio
n.



Left breast: There are no suspicious microcalcifications, masses or areas of architectural distortion
.



Bilateral mammogram is compared to prior examinations appears unchanged.



IMPRESSION:



Negative bilateral mammogram.



BI-RADS category: 1; Negative



Recommendations: Recommend annual screening mammography in one year. 



Electronically signed by: Josie Serna MD (9/23/2021 3:02 PM) [email or web address]

## 2021-12-02 ENCOUNTER — HOSPITAL ENCOUNTER (OUTPATIENT)
Dept: HOSPITAL 61 - KCIC DEXA | Age: 76
End: 2021-12-02
Attending: INTERNAL MEDICINE
Payer: MEDICARE

## 2021-12-02 DIAGNOSIS — M85.88: Primary | ICD-10-CM

## 2021-12-02 DIAGNOSIS — Z78.0: ICD-10-CM

## 2021-12-02 PROCEDURE — 77080 DXA BONE DENSITY AXIAL: CPT

## 2021-12-03 NOTE — [facility]
EXAM: BONE DENSITY STUDY - DEXA



DATE: 12/2/2021 12:54 PM



INDICATION: Reason: POST MENOPAUSAL / Spl. Instructions:  / History: .



COMPARISON: 10/21/2019



TECHNIQUE: Lumbar vertebral and proximal femoral dual-energy X-ray absorptiometry (DXA) was performed
 on a central Play[employer] device. 



FINDINGS:



SPINE ANALYSIS RESULTS:  

Average lumbar bone mineral density (BMD) (g/cm2):  0.976

Lumbar T-score (standard deviation relative to young adult mean BMD):  -0.6

Lumbar Z-score (standard deviation relative to age matched control group):  1.8



SPINE CLASSIFICATION:  Normal (T-score > -1.0).



HIP ANALYSIS RESULTS:  

Left total femoral bone mineral density (BMD) (g/cm2):  0.681

Femur T-score (standard deviation relative to young adult mean BMD):  -2.1

Femur Z-score (standard deviation relative to age matched control group):  -0.3



HIP CLASSIFICATION (World Health Organization):  Osteopenia (T-score -1.0 to -2.5).  



Note:  The 2009 International Society for Clinical Densitometry (ISCD) Official Positions state that 
osteoporosis in lucien-menopausal and post-menopausal women and in men age 50 and older may be diagnose
d if the T-score of the lumbar spine, total hip, or femoral neck is -2.5 or less. Hip BMD is reported
 from the femoral neck or total proximal femur whichever is lowest.  In cases where lumbar or hip BMD
 cannot be obtained or is not valid, distal forearm BMD is acceptable.  



IMPRESSION:

Based on the World Health Organization Guidelines, Bone Mineral Density values demonstrate osteopenia
 in the left hip and normal bone mineral density in the spine.



Electronically signed by: Nelson Fish MD (12/3/2021 8:25 AM) [email or web address]

## 2022-02-25 ENCOUNTER — HOSPITAL ENCOUNTER (OUTPATIENT)
Dept: HOSPITAL 61 - PNCL | Age: 77
Discharge: HOME | End: 2022-02-25
Attending: ANESTHESIOLOGY
Payer: MEDICARE

## 2022-02-25 DIAGNOSIS — M48.062: ICD-10-CM

## 2022-02-25 DIAGNOSIS — Z79.82: ICD-10-CM

## 2022-02-25 DIAGNOSIS — Z79.899: ICD-10-CM

## 2022-02-25 DIAGNOSIS — M51.34: ICD-10-CM

## 2022-02-25 DIAGNOSIS — M51.16: Primary | ICD-10-CM

## 2022-02-25 DIAGNOSIS — Z79.84: ICD-10-CM

## 2022-02-25 PROCEDURE — 62323 NJX INTERLAMINAR LMBR/SAC: CPT

## 2022-02-25 NOTE — PDOC
Progress Note - Pain Clinic


Date of Service:


DOS:


DATE: 2/25/22 


TIME: 13:58





Diagnosis:


Dx:


Lumbar radiculopathy with lumbar degenerative disc disease with lumbar spinal 

stenosis with neurogenic claudication


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





History or Present Illness:


HPI:


76-year-old female returns last seen August 2020 patient did very well after 

lumbar epidural steroid traction with approximately 90% improvement patient 

reports the pain is been returning now for a few months but she has been busy 

with other life activities to lower follow-up and has had significant pain now 

increasing especially over the last 2 months or so in the low back and right 

lower extremity posterior gluteus posterior lateral thigh lateral anterior thigh

posterior calf patient ports pain and numbness in the leg and the thigh 

especially on the anterior and lateral calf and thigh patient reports some pain 

in the right lateral hip as well sharp occasionally aching in the low back 

radiating the right lower extremity worse with standing walking or driving her 

car with pain in the right leg patient reports on and off intensity worse with 

standing walking changing positions initially she would note doing much better 

with the distance walking doing household activities travel with greater ease 

and comfort and standing for longer time periods without significant difficulty 

patient reports generally does not awaken from sleep at night feels better with 

sitting or lying down.  Patient rates her pain as an 8 on scale 10 is worse over

the past week for an average of 1 at its least is a 4 today.  Patient reports no

loss of motor function no bowel or bladder incontinence.  Patient reports some 

cramping sensation of the right hip at times and numbness in the right anterior 

thigh but not as often as the cramping.





Physical Exam:


VS:


Blood pressure is 137/76 pulse 83 respirations 16 temperature 99.1 F height is 

5 feet 6 inches weight is 145 pounds.


PE:


PHYSICAL EXAMINATION:





GENERAL: The patient is awake, alert, oriented, appropriate, very pleasant in 

demeanor


HEENT: Shows normocephalic, atraumatic.  Extraocular movements are intact and 

symmetrical.  Oral cavity: Mucous membranes moist and pink.  Dentition is 

intact.


NECK: Shows anterior throat supple without palpable lymphadenopathy noted.  

Swallow reflex symmetrical.


CHEST: Shows normal on inspection.  Breath sounds are clear bilaterally, no 

rales rhonchi wheezes auscultated.


HEART: Shows S1, S2 clear.  No murmurs auscultated.


ABDOMEN: Soft, nontender, nondistended.  No palpable organomegaly is noted.


BACK: Shows spine grossly in the midline.  Normal-appearing cervical lordotic 

curvature.  There is mildly increased thoracic kyphosis, some mild flattening of

the lumbar lordotic curvature.  Lumbar paraspinous muscles show symmetrical on 

inspection, on palpation shows some moderate tenderness diffusely throughout the

upper, middle and lower distribution of the paraspinous muscles without specific

trigger points, without radiation of pain.  The patient has good rotational 

motion of the lumbar spine, both laterally as well as extension and flexion 

without significant difficulty.  No tenderness over the spinous processes, 

sacrum or sacroiliac regions.


EXTREMITIES: Lower extremities show deep tendon reflexes 2+ in the patellar and 

tendo calcaneus tendons.  Motor exam is 4 on a scale of 5 with right 

dorsiflexion, extension, quadriceps and hamstring flexion and 5/5 on the left.  

Peripheral pulses are 1+ posterior tibial.  No peripheral edema is noted 

bilaterally.  Lower extremities are warm and dry to touch, equal in color and 

appearance. 


SKIN: Shows warm and dry, good turgor.  No edema.  No sores, rashes or bruising 

throughout.





Procedure:


Procedure:


Options were discussed with patient.  Patient's old chart was reviewed as her 

current medication regimen updated current review of systems updated today as 

well.  We will proceed with a lumbar epidural steroid injection today with 

fluoroscopic guidance.  Risks were discussed including but not limited to: 

Bleeding, infection, possibility of epidural hematoma and subsequent 

neurological compromise, dural puncture, headaches, spinal cord and/or nerve 

damage, side effects of steroid medication, and poor results regarding pain 

control.  Patient understands and wished to proceed.  Patient will return to the

clinic in approximate 2 weeks for follow-up, was counseled as to return 

appointment, activity level, and side effect to be aware of.





Medication Injected:


Med Injected:


Procedure is lumbar epidural steroid injection under local anesthetic using 

sterile prep and drape at the L5-S1 level using C-arm fluoroscopic guidance in 

both AP and lateral views medications injected is 20 mg dexamethasone +10mL 

preservative-free normal saline and 2 mL contrast- condition at discharge is 

stable patient tolerated procedure well had no complications.





Condition at Discharge:


Condition at Discharge:


Condition at discharge stable, paced tolerated procedure well and had no 

complications.











ALEK VERA MD               Feb 25, 2022 14:02

## 2022-02-25 NOTE — PDOC4
Procedure Note:


ICD 10 Code:


ICD 10 Code:


M4 8.062


M54.16


M51.36





Procedure Note:


Patient was consented for lumbar epidural steroid injection with fluoroscopic 

guidance.  Risks were discussed including but not limited to: Bleeding, 

infection, possibility of epidural hematoma and subsequent neurological 

compromise, dural puncture, headaches, spinal cord and/or nerve damage, side 

effects of steroid medication, and poor results regarding pain control.  Patient

understands and wished to proceed.


Procedure is lumbar epidural steroid injection under local anesthetic using st

erile prep and drape at the L5-S1 level using C-arm fluoroscopic guidance in 

both AP and lateral views medications injected is 20 mg dexamethasone +10mL 

preservative-free normal saline and 2 mL contrast- condition at discharge is 

stable patient tolerated procedure well had no complications.











ALEK VERA MD               Feb 25, 2022 14:02